# Patient Record
Sex: FEMALE | Race: WHITE | NOT HISPANIC OR LATINO | Employment: OTHER | URBAN - METROPOLITAN AREA
[De-identification: names, ages, dates, MRNs, and addresses within clinical notes are randomized per-mention and may not be internally consistent; named-entity substitution may affect disease eponyms.]

---

## 2020-06-22 LAB
ALBUMIN: 4.3 G/DL (ref 3.4–5)
ALP BLD-CCNC: 100 U/L (ref 33–136)
ALT SERPL-CCNC: 16 U/L (ref 7–45)
AST SERPL-CCNC: 17 U/L (ref 9–39)
BILIRUB SERPL-MCNC: 0.3 MG/DL (ref 0–1.2)
BILIRUBIN DIRECT: 0.1 MG/DL (ref 0–0.3)
ERYTHROCYTE [DISTWIDTH] IN BLOOD BY AUTOMATED COUNT: 13.1 % (ref 11.5–14)
HCT VFR BLD CALC: 39.1 % (ref 36–46)
HEMOGLOBIN: 12.8 G/DL (ref 12–16)
MCHC RBC AUTO-ENTMCNC: 32.7 G/DL (ref 32–36)
MCV RBC AUTO: 100 FL (ref 80–100)
PLATELET # BLD: 225 X10E9/L (ref 150–450)
RBC # BLD: 3.91 X10E12/L (ref 4–5.2)
TOTAL PROTEIN: 7.5 G/DL (ref 6.4–8.2)
WBC: 7 X10E9/L (ref 4.4–11.3)

## 2020-06-25 LAB
NIL (NEGATIVE) SPOT CONTROL: NORMAL
PANEL A SPOT COUNT: 0
PANEL B SPOT COUNT: 0
POSITIVE CONTROL SPOT COUNT: NORMAL
T-SPOT TB INTERPRETATION: NEGATIVE

## 2023-04-11 LAB
ALANINE AMINOTRANSFERASE (SGPT) (U/L) IN SER/PLAS: 13 U/L (ref 7–45)
ALBUMIN (G/DL) IN SER/PLAS: 4.2 G/DL (ref 3.4–5)
ALKALINE PHOSPHATASE (U/L) IN SER/PLAS: 110 U/L (ref 33–136)
ANION GAP IN SER/PLAS: 13 MMOL/L (ref 10–20)
ASPARTATE AMINOTRANSFERASE (SGOT) (U/L) IN SER/PLAS: 16 U/L (ref 9–39)
BILIRUBIN TOTAL (MG/DL) IN SER/PLAS: 0.4 MG/DL (ref 0–1.2)
CALCIUM (MG/DL) IN SER/PLAS: 9.7 MG/DL (ref 8.6–10.3)
CARBON DIOXIDE, TOTAL (MMOL/L) IN SER/PLAS: 28 MMOL/L (ref 21–32)
CHLORIDE (MMOL/L) IN SER/PLAS: 105 MMOL/L (ref 98–107)
CHOLESTEROL (MG/DL) IN SER/PLAS: 195 MG/DL (ref 0–199)
CHOLESTEROL IN HDL (MG/DL) IN SER/PLAS: 56.9 MG/DL
CHOLESTEROL/HDL RATIO: 3.4
CREATININE (MG/DL) IN SER/PLAS: 1.05 MG/DL (ref 0.5–1.05)
ERYTHROCYTE DISTRIBUTION WIDTH (RATIO) BY AUTOMATED COUNT: 13.2 % (ref 11.5–14.5)
ERYTHROCYTE MEAN CORPUSCULAR HEMOGLOBIN CONCENTRATION (G/DL) BY AUTOMATED: 32.7 G/DL (ref 32–36)
ERYTHROCYTE MEAN CORPUSCULAR VOLUME (FL) BY AUTOMATED COUNT: 100 FL (ref 80–100)
ERYTHROCYTES (10*6/UL) IN BLOOD BY AUTOMATED COUNT: 3.83 X10E12/L (ref 4–5.2)
GFR FEMALE: 53 ML/MIN/1.73M2
GLUCOSE (MG/DL) IN SER/PLAS: 92 MG/DL (ref 74–99)
HEMATOCRIT (%) IN BLOOD BY AUTOMATED COUNT: 38.2 % (ref 36–46)
HEMOGLOBIN (G/DL) IN BLOOD: 12.5 G/DL (ref 12–16)
LDL: 104 MG/DL (ref 0–99)
LEUKOCYTES (10*3/UL) IN BLOOD BY AUTOMATED COUNT: 7.4 X10E9/L (ref 4.4–11.3)
PLATELETS (10*3/UL) IN BLOOD AUTOMATED COUNT: 247 X10E9/L (ref 150–450)
POTASSIUM (MMOL/L) IN SER/PLAS: 4.7 MMOL/L (ref 3.5–5.3)
PROTEIN TOTAL: 7.7 G/DL (ref 6.4–8.2)
SODIUM (MMOL/L) IN SER/PLAS: 141 MMOL/L (ref 136–145)
THYROTROPIN (MIU/L) IN SER/PLAS BY DETECTION LIMIT <= 0.05 MIU/L: 1.87 MIU/L (ref 0.44–3.98)
TRIGLYCERIDE (MG/DL) IN SER/PLAS: 173 MG/DL (ref 0–149)
UREA NITROGEN (MG/DL) IN SER/PLAS: 19 MG/DL (ref 6–23)
VLDL: 35 MG/DL (ref 0–40)

## 2023-08-23 PROBLEM — R07.89 CHEST PAIN, NON-CARDIAC: Status: ACTIVE | Noted: 2023-08-23

## 2023-08-23 PROBLEM — E66.3 OVERWEIGHT (BMI 25.0-29.9): Status: ACTIVE | Noted: 2023-08-23

## 2023-08-23 PROBLEM — I73.9 INTERMITTENT CLAUDICATION (CMS-HCC): Status: ACTIVE | Noted: 2023-08-23

## 2023-08-23 PROBLEM — R09.89 BRUIT: Status: ACTIVE | Noted: 2023-08-23

## 2023-08-23 PROBLEM — R06.02 SHORTNESS OF BREATH: Status: ACTIVE | Noted: 2023-08-23

## 2023-08-23 PROBLEM — E78.5 DYSLIPIDEMIA: Status: ACTIVE | Noted: 2023-08-23

## 2023-08-23 PROBLEM — R55 SYNCOPE: Status: ACTIVE | Noted: 2023-08-23

## 2023-08-23 PROBLEM — E78.2 MIXED HYPERLIPIDEMIA: Status: ACTIVE | Noted: 2023-08-23

## 2023-08-23 PROBLEM — D64.9 ANEMIA: Status: ACTIVE | Noted: 2023-08-23

## 2023-08-23 PROBLEM — I10 ESSENTIAL HYPERTENSION: Status: ACTIVE | Noted: 2023-08-23

## 2023-08-23 PROBLEM — Z87.11 HISTORY OF PEPTIC ULCER: Status: ACTIVE | Noted: 2023-08-23

## 2023-08-23 PROBLEM — R68.89 ABNORMAL ANKLE BRACHIAL INDEX: Status: ACTIVE | Noted: 2023-08-23

## 2023-08-23 PROBLEM — Z98.61 CAD S/P PERCUTANEOUS CORONARY ANGIOPLASTY: Status: ACTIVE | Noted: 2023-08-23

## 2023-08-23 PROBLEM — I65.23 BILATERAL CAROTID ARTERY STENOSIS: Status: ACTIVE | Noted: 2023-08-23

## 2023-08-23 PROBLEM — G35 MULTIPLE SCLEROSIS, RELAPSING-REMITTING (MULTI): Status: ACTIVE | Noted: 2023-08-23

## 2023-08-23 PROBLEM — R07.9 CHEST PAIN: Status: ACTIVE | Noted: 2023-08-23

## 2023-08-23 PROBLEM — R22.2 MASS OF CHEST WALL, LEFT: Status: ACTIVE | Noted: 2023-08-23

## 2023-08-23 PROBLEM — Z90.710 H/O HYSTERECTOMY FOR BENIGN DISEASE: Status: ACTIVE | Noted: 2023-08-23

## 2023-08-23 PROBLEM — R00.2 PALPITATIONS: Status: ACTIVE | Noted: 2023-08-23

## 2023-08-23 PROBLEM — R23.2 HOT FLASHES: Status: ACTIVE | Noted: 2023-08-23

## 2023-08-23 PROBLEM — Z78.0 POST-MENOPAUSAL: Status: ACTIVE | Noted: 2023-08-23

## 2023-08-23 PROBLEM — M19.90 ARTHRITIS: Status: ACTIVE | Noted: 2023-08-23

## 2023-08-23 PROBLEM — R92.30 DENSE BREAST TISSUE ON MAMMOGRAM: Status: ACTIVE | Noted: 2023-08-23

## 2023-08-23 PROBLEM — I25.10 CAD S/P PERCUTANEOUS CORONARY ANGIOPLASTY: Status: ACTIVE | Noted: 2023-08-23

## 2023-08-23 PROBLEM — R06.89 DIFFICULTY BREATHING: Status: ACTIVE | Noted: 2023-08-23

## 2023-08-23 RX ORDER — IBUPROFEN 100 MG/5ML
1 SUSPENSION, ORAL (FINAL DOSE FORM) ORAL DAILY
COMMUNITY
Start: 2021-09-23

## 2023-08-23 RX ORDER — PREDNISOLONE ACETATE 10 MG/ML
2 SUSPENSION/ DROPS OPHTHALMIC 2 TIMES DAILY
COMMUNITY
Start: 2020-08-05 | End: 2024-03-27 | Stop reason: WASHOUT

## 2023-08-23 RX ORDER — CETIRIZINE HYDROCHLORIDE 10 MG/1
1 TABLET ORAL DAILY
COMMUNITY

## 2023-08-23 RX ORDER — NITROGLYCERIN 0.4 MG/1
1 TABLET SUBLINGUAL EVERY 5 MIN PRN
COMMUNITY
Start: 2022-02-14

## 2023-08-23 RX ORDER — DICYCLOMINE HYDROCHLORIDE 10 MG/1
1 CAPSULE ORAL 2 TIMES DAILY
COMMUNITY
Start: 2022-05-05 | End: 2024-03-27 | Stop reason: WASHOUT

## 2023-08-23 RX ORDER — PREDNISOLONE ACETATE 10 MG/ML
1 SUSPENSION/ DROPS OPHTHALMIC
COMMUNITY
Start: 2023-07-26

## 2023-08-23 RX ORDER — BUTALBITAL, ACETAMINOPHEN AND CAFFEINE 50; 325; 40 MG/1; MG/1; MG/1
1 TABLET ORAL EVERY 4 HOURS PRN
COMMUNITY
End: 2024-03-27 | Stop reason: WASHOUT

## 2023-08-23 RX ORDER — ALBUTEROL SULFATE 90 UG/1
2 AEROSOL, METERED RESPIRATORY (INHALATION) EVERY 6 HOURS PRN
COMMUNITY
End: 2024-03-27 | Stop reason: WASHOUT

## 2023-08-23 RX ORDER — ACETAMINOPHEN 500 MG
1 TABLET ORAL DAILY
COMMUNITY

## 2023-08-23 RX ORDER — PANTOPRAZOLE SODIUM 40 MG/1
1 TABLET, DELAYED RELEASE ORAL DAILY
COMMUNITY
Start: 2021-09-23

## 2023-08-23 RX ORDER — MELOXICAM 7.5 MG/1
1 TABLET ORAL DAILY PRN
COMMUNITY

## 2023-08-23 RX ORDER — FLAXSEED OIL 1000 MG
CAPSULE ORAL
COMMUNITY
Start: 2020-08-05

## 2023-08-23 RX ORDER — FLUTICASONE FUROATE 100 UG/1
1 POWDER RESPIRATORY (INHALATION) DAILY
COMMUNITY
Start: 2020-08-05

## 2023-08-23 RX ORDER — MAGNESIUM 200 MG
1 TABLET ORAL DAILY
COMMUNITY

## 2023-08-23 RX ORDER — ASPIRIN 325 MG
TABLET, DELAYED RELEASE (ENTERIC COATED) ORAL
COMMUNITY
Start: 2021-09-23 | End: 2024-03-27 | Stop reason: WASHOUT

## 2023-08-23 RX ORDER — ASPIRIN 81 MG/1
1 TABLET ORAL DAILY
COMMUNITY
End: 2024-03-27 | Stop reason: WASHOUT

## 2023-08-23 RX ORDER — METOPROLOL SUCCINATE 100 MG/1
1 TABLET, EXTENDED RELEASE ORAL DAILY
COMMUNITY
Start: 2021-09-23 | End: 2024-03-25

## 2023-08-23 RX ORDER — NAPROXEN SODIUM 220 MG/1
TABLET, FILM COATED ORAL
COMMUNITY
Start: 2021-09-23

## 2023-08-23 RX ORDER — BUTALBITAL, ACETAMINOPHEN, CAFFEINE AND CODEINE PHOSPHATE 50; 325; 40; 30 MG/1; MG/1; MG/1; MG/1
CAPSULE ORAL
COMMUNITY
Start: 2022-03-23

## 2023-08-23 RX ORDER — LOSARTAN POTASSIUM 50 MG/1
1 TABLET ORAL DAILY
COMMUNITY
Start: 2021-09-23

## 2023-10-01 PROBLEM — Z78.0 POST-MENOPAUSAL: Status: RESOLVED | Noted: 2023-08-23 | Resolved: 2023-10-01

## 2023-10-03 ENCOUNTER — OFFICE VISIT (OUTPATIENT)
Dept: OBSTETRICS AND GYNECOLOGY | Facility: CLINIC | Age: 83
End: 2023-10-03
Payer: MEDICARE

## 2023-10-03 VITALS
SYSTOLIC BLOOD PRESSURE: 110 MMHG | HEIGHT: 63 IN | BODY MASS INDEX: 27.46 KG/M2 | DIASTOLIC BLOOD PRESSURE: 70 MMHG | WEIGHT: 155 LBS

## 2023-10-03 DIAGNOSIS — Z90.710 H/O HYSTERECTOMY FOR BENIGN DISEASE: ICD-10-CM

## 2023-10-03 DIAGNOSIS — Z12.31 ENCOUNTER FOR SCREENING MAMMOGRAM FOR BREAST CANCER: ICD-10-CM

## 2023-10-03 DIAGNOSIS — N95.1 VASOMOTOR SYMPTOMS DUE TO MENOPAUSE: ICD-10-CM

## 2023-10-03 DIAGNOSIS — Z01.419 NORMAL GYNECOLOGIC EXAMINATION: Primary | ICD-10-CM

## 2023-10-03 PROCEDURE — 1159F MED LIST DOCD IN RCRD: CPT | Performed by: OBSTETRICS & GYNECOLOGY

## 2023-10-03 PROCEDURE — 99397 PER PM REEVAL EST PAT 65+ YR: CPT | Performed by: OBSTETRICS & GYNECOLOGY

## 2023-10-03 PROCEDURE — 3078F DIAST BP <80 MM HG: CPT | Performed by: OBSTETRICS & GYNECOLOGY

## 2023-10-03 PROCEDURE — 1036F TOBACCO NON-USER: CPT | Performed by: OBSTETRICS & GYNECOLOGY

## 2023-10-03 PROCEDURE — 3074F SYST BP LT 130 MM HG: CPT | Performed by: OBSTETRICS & GYNECOLOGY

## 2023-10-03 NOTE — PROGRESS NOTES
GYNECOLOGIC PROGRESS NOTE    CC:    Chief Complaint   Patient presents with    Gynecologic Exam     Annual exam - would also like to discuss hot flashes - was on estrogen but no longer on it  Mamm 10/2021 het dense  Dexa 4/2023 normal  Colon 2013        HPI:  Lashell Alvarez is here for a routine GYN examination.  Having recurrent menopausal symptoms off of HRT that she has been on for 40+ years since her hysterectomy with removal of her ovaries approximately age 41.  Patient is having clammy spells in the later afternoon and evening and that is impact impeding her sleep, she is not having true hot flashes but just feels that she has temperature on balance related to being off of her HRT.    Depression screen:  negative  Fall screen:  negative  DV screen:  negative      ROS:  GEN - no fevers or chills  RESP - no SOB or cough  GI - no blood in BMs  URO - no hematuria  GYN - no VB or vaginal discharge  ENDO - see HPI  PSYCH - mood OK      Past Medical History:   Diagnosis Date    Personal history of other diseases of the musculoskeletal system and connective tissue 08/05/2020    History of osteopenia    Personal history of other specified conditions     History of anesthesia complications     Past Surgical History:   Procedure Laterality Date    OTHER SURGICAL HISTORY  09/17/2020    Hand surgery    OTHER SURGICAL HISTORY  03/09/2022    Colonoscopy    OTHER SURGICAL HISTORY  03/09/2022    Cholecystectomy    OTHER SURGICAL HISTORY  03/09/2022    Cardiac catheterization    OTHER SURGICAL HISTORY  03/09/2022    Cornea transplantation    OTHER SURGICAL HISTORY  03/09/2022    Nose surgery    OTHER SURGICAL HISTORY  03/09/2022    Bile duct reconstruction    OTHER SURGICAL HISTORY  03/09/2022    Esophagogastroduodenoscopy    OTHER SURGICAL HISTORY  09/24/2021    Hysterectomy abdominal     Social History     Socioeconomic History    Marital status:      Spouse name: Not on file    Number of children: Not on file    Years of  "education: Not on file    Highest education level: Not on file   Occupational History    Not on file   Tobacco Use    Smoking status: Never    Smokeless tobacco: Never   Substance and Sexual Activity    Alcohol use: Not on file    Drug use: Not on file    Sexual activity: Not on file   Other Topics Concern    Not on file   Social History Narrative    Not on file     Social Determinants of Health     Financial Resource Strain: Not on file   Food Insecurity: Not on file   Transportation Needs: Not on file   Physical Activity: Not on file   Stress: Not on file   Social Connections: Not on file   Intimate Partner Violence: Not on file   Housing Stability: Not on file     Cancer-related family history is not on file.       PHYSICAL EXAM:  /70 (BP Location: Left arm, Patient Position: Sitting)   Ht 1.6 m (5' 3\")   Wt 70.3 kg (155 lb)   BMI 27.46 kg/m²   GEN:  A&O, NAD  ABD:  NT/ND, soft, no palpable masses  URO:  atrophic urethral meatus, no bladder TTP  GYN:    -atrophic vulva w/o lesions or ulcers  -cervix/uterus/adnexa surgically absent   -perineum w/o lesions or ulcers  -rectal  w/o external anal hemorrhoids or lesions  BREAST:  no masses or TTP, no skin lesions or nipple discharge  PSYCH:  normal affect, non-anxious        IMPRESSION/PLAN:  Problem List Items Addressed This Visit       H/O hysterectomy for benign disease     Other Visit Diagnoses       Normal gynecologic examination    -  Primary    Encounter for screening mammogram for breast cancer        Relevant Orders    BI mammo bilateral screening tomosynthesis    Vasomotor symptoms due to menopause                 Pap/HPV no longer indicated as > age 65 w/o previous history of HGSIL, also prior hysterectomy for benign indications..  No indication for pap > age 65 in this patient as no Hx of HGSIL and previously up to date with cervical CA screening prior to age 65 and not immunosuppressed.     Mammograms:  Counseling done on potentially stopping " mammograms > age 75 per USPTF guidelines--=patient is healthy with expected life expectancy of at least 10 years, she elects to continue screening mammograms at this time every other year.    Colon CA screening: normal in 2013, GI Dr. Julian does not recommend any more screenings (given her age and her tortuous colon on last colonoscopy).    DEXA scan normal in 2023.       No need for follow-up with GYN given prior CONCHA/BSO for benign indications and no GYN Rx use.        Jamie Suresh, DO

## 2023-11-01 ENCOUNTER — TELEPHONE (OUTPATIENT)
Dept: PRIMARY CARE | Facility: CLINIC | Age: 83
End: 2023-11-01
Payer: MEDICARE

## 2023-11-01 NOTE — TELEPHONE ENCOUNTER
Patient calling to ask if you wanted to re-check her B-12 level lab? Stated that she had labs done in April and B-12 was slightly low, so she was not clear if you wanted to repeat lab and when you wanted to see her next? Stated that she can schedule yearly and labs for next year if nothing has to be done.   Please advise.

## 2023-11-05 DIAGNOSIS — E78.5 DYSLIPIDEMIA: ICD-10-CM

## 2023-11-05 DIAGNOSIS — Z78.0 POST-MENOPAUSE: ICD-10-CM

## 2023-11-05 DIAGNOSIS — I10 ESSENTIAL HYPERTENSION: ICD-10-CM

## 2023-11-05 DIAGNOSIS — E55.9 VITAMIN D INSUFFICIENCY: ICD-10-CM

## 2023-11-05 DIAGNOSIS — D51.8 OTHER VITAMIN B12 DEFICIENCY ANEMIA: Primary | ICD-10-CM

## 2023-11-06 NOTE — TELEPHONE ENCOUNTER
Please let Lashell know that her labs from April were not bad and I don't feel I need to repeat anything at this time.  I will put an order on her chart for March 2024 and she can schedule a follow up after she has her next blood work done.  If she gets sick this winter, she can call for a virtual visit sooner.  She should come in again around March or April or sooner if needed.

## 2023-11-11 ENCOUNTER — APPOINTMENT (OUTPATIENT)
Dept: RADIOLOGY | Facility: HOSPITAL | Age: 83
End: 2023-11-11
Payer: MEDICARE

## 2024-03-22 DIAGNOSIS — I10 ESSENTIAL (PRIMARY) HYPERTENSION: ICD-10-CM

## 2024-03-25 RX ORDER — METOPROLOL SUCCINATE 100 MG/1
100 TABLET, EXTENDED RELEASE ORAL DAILY
Qty: 90 TABLET | Refills: 3 | Status: SHIPPED | OUTPATIENT
Start: 2024-03-25

## 2024-03-27 ENCOUNTER — OFFICE VISIT (OUTPATIENT)
Dept: CARDIOLOGY | Facility: CLINIC | Age: 84
End: 2024-03-27
Payer: MEDICARE

## 2024-03-27 VITALS
HEIGHT: 62 IN | BODY MASS INDEX: 29.04 KG/M2 | SYSTOLIC BLOOD PRESSURE: 124 MMHG | HEART RATE: 76 BPM | DIASTOLIC BLOOD PRESSURE: 76 MMHG | WEIGHT: 157.8 LBS

## 2024-03-27 DIAGNOSIS — E78.2 MIXED HYPERLIPIDEMIA: ICD-10-CM

## 2024-03-27 DIAGNOSIS — R07.9 CHEST PAIN, UNSPECIFIED TYPE: ICD-10-CM

## 2024-03-27 DIAGNOSIS — G35 MULTIPLE SCLEROSIS, RELAPSING-REMITTING (MULTI): ICD-10-CM

## 2024-03-27 DIAGNOSIS — I10 ESSENTIAL HYPERTENSION: ICD-10-CM

## 2024-03-27 DIAGNOSIS — I65.23 BILATERAL CAROTID ARTERY STENOSIS: ICD-10-CM

## 2024-03-27 DIAGNOSIS — I25.10 CAD S/P PERCUTANEOUS CORONARY ANGIOPLASTY: ICD-10-CM

## 2024-03-27 DIAGNOSIS — R06.02 SHORTNESS OF BREATH: ICD-10-CM

## 2024-03-27 DIAGNOSIS — Z78.9 NEVER SMOKED CIGARETTES: ICD-10-CM

## 2024-03-27 DIAGNOSIS — Z98.61 CAD S/P PERCUTANEOUS CORONARY ANGIOPLASTY: ICD-10-CM

## 2024-03-27 PROCEDURE — 99214 OFFICE O/P EST MOD 30 MIN: CPT | Performed by: INTERNAL MEDICINE

## 2024-03-27 PROCEDURE — 1036F TOBACCO NON-USER: CPT | Performed by: INTERNAL MEDICINE

## 2024-03-27 PROCEDURE — 3078F DIAST BP <80 MM HG: CPT | Performed by: INTERNAL MEDICINE

## 2024-03-27 PROCEDURE — 3074F SYST BP LT 130 MM HG: CPT | Performed by: INTERNAL MEDICINE

## 2024-03-27 PROCEDURE — 1159F MED LIST DOCD IN RCRD: CPT | Performed by: INTERNAL MEDICINE

## 2024-03-27 ASSESSMENT — ENCOUNTER SYMPTOMS: SHORTNESS OF BREATH: 1

## 2024-03-27 NOTE — PROGRESS NOTES
CARDIOLOGY OFFICE VISIT      CHIEF COMPLAINT      HISTORY OF PRESENT ILLNESS  The patient states that she has noticed more shortness of breath and fatigue with activities than previous.  She is uncertain whether this is due to her multiple sclerosis or whether something could be going on from a cardiac standpoint.  She states that earlier this year she had a bad episode of RSV and she wonders whether she is still recuperating a bit from that.  She denies chest discomfort.  She denies prolonged palpitations or syncope.  She denies any problems or medications.  I told her we will do a Lexiscan to make sure there is no evidence of myocardial ischemia in view of the dyspnea with activities.  She did have cardiac catheterization done about 2 years ago and it was satisfactory.      IMPRESSION:   1. Coronary artery disease, no angina.  2. Remote multivessel PCI, last 2013.  3. Essential hypertension.  4. Mixed hyperlipidemia. Unable to tolerate statins due to severe myalgias.  5. Carotid artery disease, bilateral, mild, asymptomatic.  6. History of gastroesophageal reflux.  7. Overweight.  8. Multiple sclerosis        Please excuse any errors in grammar or translation related to this dictation. Voice recognition software was utilized to prepare this document.            Past Medical History  Past Medical History:   Diagnosis Date    Personal history of other diseases of the musculoskeletal system and connective tissue 08/05/2020    History of osteopenia    Personal history of other specified conditions     History of anesthesia complications       Social History  Social History     Tobacco Use    Smoking status: Never    Smokeless tobacco: Never   Substance Use Topics    Alcohol use: Never    Drug use: Never       Family History     Family History   Problem Relation Name Age of Onset    Heart attack Mother      Heart attack Father      Multiple sclerosis Mother's Brother      Multiple sclerosis Other           Allergies:  Allergies   Allergen Reactions    Metoprolol Other and Shortness of breath    Adhesive Tape-Silicones Unknown    Albuterol Unknown    Atorvastatin Unknown    Clopidogrel Unknown    Codeine Unknown    Ezetimibe Unknown    Fenofibrate Unknown    Fluvastatin Unknown    Mupirocin Unknown    Penicillin Unknown    Pravastatin Unknown    Shellfish Containing Products Unknown    Statins-Hmg-Coa Reductase Inhibitors Unknown    Sulfa (Sulfonamide Antibiotics) Unknown    Tobradex [Tobramycin-Dexamethasone] Unknown    Trilipix [Fenofibric Acid (Choline)] Unknown    Wheat Unknown        Outpatient Medications:  Current Outpatient Medications   Medication Instructions    ascorbic acid (Vitamin C) 1,000 mg tablet 1 tablet, oral, Daily    aspirin 81 mg chewable tablet oral, Daily RT, CHEW AND SWALLOW 1 TABLET DAILY.    butalbitaL-acetaminop-caf-cod (Fioricet W/Codeine) -18-30 mg capsule oral, As needed    cetirizine (ZyrTEC) 10 mg tablet 1 tablet, oral, Daily, In the evening    cholecalciferol (Vitamin D-3) 50 mcg (2,000 unit) capsule 1 capsule, oral, Daily    cyanocobalamin, vitamin B-12, 1,000 mcg tablet, sublingual 1 tablet, sublingual, Daily    flaxseed oiL 1,000 mg capsule oral, Take as directed    fluticasone furoate (Arnuity Ellipta) 100 mcg/actuation inhaler 1 puff, inhalation, Daily    losartan (Cozaar) 50 mg tablet 1 tablet, oral, Daily    meloxicam (Mobic) 7.5 mg tablet 1 tablet, oral, Daily PRN    nitroglycerin (Nitrostat) 0.4 mg SL tablet 1 tablet, sublingual, Every 5 min PRN, every 5 minutes x 3 doses , As needed for chest pain    pantoprazole (Protonix) 40 mg EC tablet 1 tablet, oral, Daily    prednisoLONE acetate (Pred Forte) 1 % ophthalmic suspension 1 drop, Right Eye, Daily RT    Toprol  mg, oral, Daily          REVIEW OF SYSTEMS  Review of Systems   Cardiovascular:  Positive for chest pain.   Respiratory:  Positive for shortness of breath.          VITALS  Vitals:    03/27/24 1526   BP:  124/76   Pulse: 76       PHYSICAL EXAM  Vitals and nursing note reviewed.   Constitutional:       Appearance: Healthy appearance.   Eyes:      Conjunctiva/sclera: Conjunctivae normal.      Pupils: Pupils are equal, round, and reactive to light.   Pulmonary:      Effort: Pulmonary effort is normal.      Breath sounds: Normal breath sounds.   Cardiovascular:      PMI at left midclavicular line. Normal rate. Regular rhythm.      Murmurs: There is no murmur.      No gallop.  No click. No rub.   Pulses:     Intact distal pulses.   Edema:     Peripheral edema absent.   Musculoskeletal: Normal range of motion. Skin:     General: Skin is warm and dry.   Neurological:      Mental Status: Alert and oriented to person, place and time.           ASSESSMENT AND PLAN  Diagnoses and all orders for this visit:  Chest pain, unspecified type  CAD S/P percutaneous coronary angioplasty  Essential hypertension  Mixed hyperlipidemia  Bilateral carotid artery stenosis  Multiple sclerosis, relapsing-remitting (CMS/HCC)  BMI 28.0-28.9,adult  Never smoked cigarettes  Shortness of breath      [unfilled]

## 2024-03-27 NOTE — PATIENT INSTRUCTIONS
You will be scheduled for a Lexiscan nuclear stress test  Will call patient with test results once reviewed by physician    Follow up office visit in 1 year.    Continue same medications/treatment.  Patient educated on proper medication use.  Patient educated on risk factor modification.  Please bring any lab results from other providers / physicians to your next appointment.    Please bring all medicines, vitamins and herbal supplements with you when you come to the office.    Prescriptions will not be filled unless you are compliant with your follow up appointments or have a follow up  appointment scheduled as per instruction of your physician.  Refills should be requested at the time of  Your visit.    Silvina FAIR LPN, am scribing for and in the presence of Dr. Harley Yeboah MD, FACC

## 2024-04-16 ENCOUNTER — APPOINTMENT (OUTPATIENT)
Dept: ORTHOPEDIC SURGERY | Facility: CLINIC | Age: 84
End: 2024-04-16
Payer: MEDICARE

## 2024-04-17 ENCOUNTER — APPOINTMENT (OUTPATIENT)
Dept: RADIOLOGY | Facility: CLINIC | Age: 84
End: 2024-04-17
Payer: MEDICARE

## 2024-04-17 ENCOUNTER — APPOINTMENT (OUTPATIENT)
Dept: CARDIOLOGY | Facility: CLINIC | Age: 84
End: 2024-04-17
Payer: MEDICARE

## 2024-04-25 ENCOUNTER — APPOINTMENT (OUTPATIENT)
Dept: CARDIOLOGY | Facility: CLINIC | Age: 84
End: 2024-04-25
Payer: MEDICARE

## 2024-04-30 ENCOUNTER — APPOINTMENT (OUTPATIENT)
Dept: ORTHOPEDIC SURGERY | Facility: CLINIC | Age: 84
End: 2024-04-30
Payer: MEDICARE

## 2024-05-14 ENCOUNTER — APPOINTMENT (OUTPATIENT)
Dept: ORTHOPEDIC SURGERY | Facility: CLINIC | Age: 84
End: 2024-05-14
Payer: MEDICARE

## 2024-08-07 ENCOUNTER — HOSPITAL ENCOUNTER (OUTPATIENT)
Dept: RADIOLOGY | Facility: CLINIC | Age: 84
Discharge: HOME | End: 2024-08-07
Payer: MEDICARE

## 2024-08-07 ENCOUNTER — TELEPHONE (OUTPATIENT)
Dept: CARDIOLOGY | Facility: CLINIC | Age: 84
End: 2024-08-07
Payer: MEDICARE

## 2024-08-07 ENCOUNTER — HOSPITAL ENCOUNTER (OUTPATIENT)
Dept: CARDIOLOGY | Facility: CLINIC | Age: 84
Discharge: HOME | End: 2024-08-07
Payer: MEDICARE

## 2024-08-07 DIAGNOSIS — R06.02 SHORTNESS OF BREATH: ICD-10-CM

## 2024-08-07 DIAGNOSIS — I25.10 CAD S/P PERCUTANEOUS CORONARY ANGIOPLASTY: ICD-10-CM

## 2024-08-07 DIAGNOSIS — Z98.61 CAD S/P PERCUTANEOUS CORONARY ANGIOPLASTY: ICD-10-CM

## 2024-08-07 DIAGNOSIS — R07.9 CHEST PAIN, UNSPECIFIED TYPE: ICD-10-CM

## 2024-08-07 DIAGNOSIS — R07.9 CHEST PAIN: ICD-10-CM

## 2024-08-07 DIAGNOSIS — R06.02 SHORTNESS OF BREATH: Primary | ICD-10-CM

## 2024-08-07 PROCEDURE — 3430000001 HC RX 343 DIAGNOSTIC RADIOPHARMACEUTICALS: Performed by: INTERNAL MEDICINE

## 2024-08-07 PROCEDURE — 2500000004 HC RX 250 GENERAL PHARMACY W/ HCPCS (ALT 636 FOR OP/ED): Performed by: INTERNAL MEDICINE

## 2024-08-07 PROCEDURE — 78452 HT MUSCLE IMAGE SPECT MULT: CPT

## 2024-08-07 PROCEDURE — A9502 TC99M TETROFOSMIN: HCPCS | Performed by: INTERNAL MEDICINE

## 2024-08-07 PROCEDURE — 93017 CV STRESS TEST TRACING ONLY: CPT

## 2024-08-07 RX ORDER — REGADENOSON 0.08 MG/ML
0.4 INJECTION, SOLUTION INTRAVENOUS ONCE
Status: COMPLETED | OUTPATIENT
Start: 2024-08-07 | End: 2024-08-07

## 2024-08-07 NOTE — TELEPHONE ENCOUNTER
----- Message from Nurse Silvina LANGSTON sent at 8/7/2024 11:41 AM EDT -----    ----- Message -----  From: Harley Yeboah MD  Sent: 8/7/2024  11:35 AM EDT  To: Silvina Schmidt LPN    Normal nuclear stress test  ----- Message -----  From: Interface, Radiology Results In  Sent: 8/7/2024  10:39 AM EDT  To: Harley Yeboah MD

## 2024-08-09 ENCOUNTER — HOSPITAL ENCOUNTER (OUTPATIENT)
Dept: RADIOLOGY | Facility: CLINIC | Age: 84
Discharge: HOME | End: 2024-08-09
Payer: MEDICARE

## 2024-08-09 ENCOUNTER — OFFICE VISIT (OUTPATIENT)
Dept: ORTHOPEDIC SURGERY | Facility: CLINIC | Age: 84
End: 2024-08-09
Payer: MEDICARE

## 2024-08-09 VITALS — BODY MASS INDEX: 28.89 KG/M2 | HEIGHT: 62 IN | WEIGHT: 157 LBS

## 2024-08-09 DIAGNOSIS — M54.50 LUMBAR PAIN: ICD-10-CM

## 2024-08-09 PROCEDURE — 72110 X-RAY EXAM L-2 SPINE 4/>VWS: CPT

## 2024-08-09 PROCEDURE — 99214 OFFICE O/P EST MOD 30 MIN: CPT | Performed by: ORTHOPAEDIC SURGERY

## 2024-08-09 ASSESSMENT — ENCOUNTER SYMPTOMS
LOSS OF SENSATION IN FEET: 0
DEPRESSION: 0
OCCASIONAL FEELINGS OF UNSTEADINESS: 0

## 2024-08-09 ASSESSMENT — PAIN SCALES - GENERAL: PAINLEVEL_OUTOF10: 3

## 2024-08-09 ASSESSMENT — PAIN - FUNCTIONAL ASSESSMENT: PAIN_FUNCTIONAL_ASSESSMENT: 0-10

## 2024-08-09 NOTE — PROGRESS NOTES
"Lashell Alvarez is a 84 y.o. female who presents for Pain of the Lower Back (xrays).    HPI:  84-year-old female here for new patient evaluation of low back pain.  She denies any fever chills nausea vomiting night sweats.  She has no bowel or bladder complaints.    Physical exam:  Well-nourished, well kept.No lymphangitis or lymphadenopathy in the examined extremities.  Gait normal.  Can stand on heels and toes.   Examination of the back shows mild tenderness in the paraspinous musculature.  There is no significant decreased range of motion in all directions due to guarding/muscle spasms and pain at extremes.  There is good strength and no instability.  Examination of the lower extremities reveals no point tenderness, swelling, or deformity.  Range of motion of the hips, knees, and ankles are full without crepitance, instability, or exacerbation of pain.  Strength is 5/5 throughout except right knee flexion extension and hip flexion is about 4+/5.  No redness, abrasions, or lesions on extremities  Gross sensation intact in the extremities.  Deep tendon reflexes 1+ bilateral. Clonus negative.  Affect normal.  Alert and oriented ×3.  Coordination normal.    Imaging studies:  AP PA lateral flexion-extension plain films of the lumbar spine were ordered and reviewed today.  I also reviewed a DEXA scan from 2023.    Assessment:  84-year-old female here for new patient evaluation of mostly low back pain.  She has a spondylolisthesis at L3 on 4, and severe degenerative disc disease at that level.  She had a back injury many many years ago, and she is having minimal low back pain now.  She says that it feels like her back is \"slipping\"and she wanted to be evaluated to see if there was anything serious that needed to be addressed.  She is not getting any radiating back pain however she does have multiple sclerosis so she has issues with her right leg and hip.  No recent physical therapy or chiropractic care, she had some " cortisone injections many years ago, no history of back surgery.    We have ordered and reviewed test today x-rays, DEXA scan.  I reviewed the notes from Dr. Yeboah that discusses her multiple sclerosis from March 29, 2023.  This is an exacerbation of a chronic problem.  Her sister is with her today acting as helpful and necessary historian.    For complete plan and/or surgical details, please refer to Dr. Espana's portion of this split dictation.    -Burton Matthews PA-C    In a face-to-face encounter, I performed a history and physical examination, discussed pertinent diagnostic studies if indicated, and discussed diagnosis and management strategies with both the patient and the midlevel provider.  I reviewed the midlevel's note and agree with the documented findings and plan of care.    Back pain.  Patient wanted to make sure she was not getting up paralyzed.  X-rays show an old spondylolisthesis at L3-4 which is only a few millimeters.  She has degenerative changes at that level.  Overall her alignment of her spine is normal.  I explained to her that she is as likely as the average person her age to have a fall and have paralysis or any other severe complication.  She has no restrictions with her activity.  If her back pain is tolerable she can engage in activities as tolerated.  This is a patient who has had an exacerbation of a chronic problem.  We have ordered and reviewed x-rays today.  We also reviewed the results of a DEXA scan today.    Khang Espana MD  Orthopedic surgery

## 2024-08-13 DIAGNOSIS — I10 ESSENTIAL (PRIMARY) HYPERTENSION: ICD-10-CM

## 2024-08-13 RX ORDER — LOSARTAN POTASSIUM 50 MG/1
50 TABLET, FILM COATED ORAL DAILY
Qty: 90 TABLET | Refills: 3 | Status: SHIPPED | OUTPATIENT
Start: 2024-08-13

## 2024-09-09 ENCOUNTER — APPOINTMENT (OUTPATIENT)
Dept: PRIMARY CARE | Facility: CLINIC | Age: 84
End: 2024-09-09
Payer: MEDICARE

## 2024-10-17 ENCOUNTER — LAB (OUTPATIENT)
Dept: LAB | Facility: LAB | Age: 84
End: 2024-10-17

## 2024-10-17 DIAGNOSIS — E55.9 VITAMIN D INSUFFICIENCY: ICD-10-CM

## 2024-10-17 DIAGNOSIS — E78.5 DYSLIPIDEMIA: ICD-10-CM

## 2024-10-17 DIAGNOSIS — D51.8 OTHER VITAMIN B12 DEFICIENCY ANEMIA: ICD-10-CM

## 2024-10-17 DIAGNOSIS — Z78.0 POST-MENOPAUSE: ICD-10-CM

## 2024-10-17 DIAGNOSIS — I10 ESSENTIAL HYPERTENSION: ICD-10-CM

## 2024-10-17 LAB
25(OH)D3 SERPL-MCNC: 72 NG/ML (ref 30–100)
ALBUMIN SERPL BCP-MCNC: 4.5 G/DL (ref 3.4–5)
ALP SERPL-CCNC: 119 U/L (ref 33–136)
ALT SERPL W P-5'-P-CCNC: 13 U/L (ref 7–45)
ANION GAP SERPL CALC-SCNC: 13 MMOL/L (ref 10–20)
AST SERPL W P-5'-P-CCNC: 15 U/L (ref 9–39)
BASOPHILS # BLD AUTO: 0.05 X10*3/UL (ref 0–0.1)
BASOPHILS NFR BLD AUTO: 0.6 %
BILIRUB SERPL-MCNC: 0.6 MG/DL (ref 0–1.2)
BUN SERPL-MCNC: 19 MG/DL (ref 6–23)
CALCIUM SERPL-MCNC: 10.4 MG/DL (ref 8.6–10.3)
CHLORIDE SERPL-SCNC: 103 MMOL/L (ref 98–107)
CHOLEST SERPL-MCNC: 238 MG/DL (ref 0–199)
CHOLESTEROL/HDL RATIO: 4.2
CO2 SERPL-SCNC: 28 MMOL/L (ref 21–32)
CREAT SERPL-MCNC: 1.04 MG/DL (ref 0.5–1.05)
EGFRCR SERPLBLD CKD-EPI 2021: 53 ML/MIN/1.73M*2
EOSINOPHIL # BLD AUTO: 0.14 X10*3/UL (ref 0–0.4)
EOSINOPHIL NFR BLD AUTO: 1.8 %
ERYTHROCYTE [DISTWIDTH] IN BLOOD BY AUTOMATED COUNT: 13 % (ref 11.5–14.5)
GLUCOSE SERPL-MCNC: 107 MG/DL (ref 74–99)
HCT VFR BLD AUTO: 41.5 % (ref 36–46)
HDLC SERPL-MCNC: 57.1 MG/DL
HGB BLD-MCNC: 13.6 G/DL (ref 12–16)
IMM GRANULOCYTES # BLD AUTO: 0.02 X10*3/UL (ref 0–0.5)
IMM GRANULOCYTES NFR BLD AUTO: 0.3 % (ref 0–0.9)
LDLC SERPL CALC-MCNC: 138 MG/DL
LYMPHOCYTES # BLD AUTO: 2.76 X10*3/UL (ref 0.8–3)
LYMPHOCYTES NFR BLD AUTO: 35.2 %
MCH RBC QN AUTO: 32.6 PG (ref 26–34)
MCHC RBC AUTO-ENTMCNC: 32.8 G/DL (ref 32–36)
MCV RBC AUTO: 100 FL (ref 80–100)
MONOCYTES # BLD AUTO: 0.78 X10*3/UL (ref 0.05–0.8)
MONOCYTES NFR BLD AUTO: 10 %
NEUTROPHILS # BLD AUTO: 4.08 X10*3/UL (ref 1.6–5.5)
NEUTROPHILS NFR BLD AUTO: 52.1 %
NON HDL CHOLESTEROL: 181 MG/DL (ref 0–149)
NRBC BLD-RTO: 0 /100 WBCS (ref 0–0)
PLATELET # BLD AUTO: 271 X10*3/UL (ref 150–450)
POTASSIUM SERPL-SCNC: 4.6 MMOL/L (ref 3.5–5.3)
PROT SERPL-MCNC: 7.4 G/DL (ref 6.4–8.2)
RBC # BLD AUTO: 4.17 X10*6/UL (ref 4–5.2)
SODIUM SERPL-SCNC: 139 MMOL/L (ref 136–145)
TRIGL SERPL-MCNC: 213 MG/DL (ref 0–149)
TSH SERPL-ACNC: 2.62 MIU/L (ref 0.44–3.98)
VIT B12 SERPL-MCNC: 929 PG/ML (ref 211–911)
VLDL: 43 MG/DL (ref 0–40)
WBC # BLD AUTO: 7.8 X10*3/UL (ref 4.4–11.3)

## 2024-10-17 PROCEDURE — 36415 COLL VENOUS BLD VENIPUNCTURE: CPT

## 2024-10-17 PROCEDURE — 85025 COMPLETE CBC W/AUTO DIFF WBC: CPT

## 2024-10-17 PROCEDURE — 82306 VITAMIN D 25 HYDROXY: CPT

## 2024-10-17 PROCEDURE — 84443 ASSAY THYROID STIM HORMONE: CPT

## 2024-10-17 PROCEDURE — 80061 LIPID PANEL: CPT

## 2024-10-17 PROCEDURE — 80053 COMPREHEN METABOLIC PANEL: CPT

## 2024-10-17 PROCEDURE — 82607 VITAMIN B-12: CPT

## 2024-10-29 ENCOUNTER — APPOINTMENT (OUTPATIENT)
Dept: PRIMARY CARE | Facility: CLINIC | Age: 84
End: 2024-10-29
Payer: MEDICARE

## 2024-10-29 VITALS
BODY MASS INDEX: 29.22 KG/M2 | TEMPERATURE: 97.9 F | HEIGHT: 62 IN | DIASTOLIC BLOOD PRESSURE: 82 MMHG | WEIGHT: 158.8 LBS | HEART RATE: 76 BPM | SYSTOLIC BLOOD PRESSURE: 140 MMHG | OXYGEN SATURATION: 98 %

## 2024-10-29 DIAGNOSIS — G35 MULTIPLE SCLEROSIS, RELAPSING-REMITTING (MULTI): ICD-10-CM

## 2024-10-29 DIAGNOSIS — Z00.00 MEDICARE ANNUAL WELLNESS VISIT, SUBSEQUENT: Primary | ICD-10-CM

## 2024-10-29 DIAGNOSIS — Z71.2 ENCOUNTER TO DISCUSS TEST RESULTS: ICD-10-CM

## 2024-10-29 DIAGNOSIS — F43.21 GRIEF REACTION: ICD-10-CM

## 2024-10-29 PROBLEM — F43.20 GRIEF REACTION: Status: ACTIVE | Noted: 2024-10-29

## 2024-10-29 PROCEDURE — 3288F FALL RISK ASSESSMENT DOCD: CPT | Performed by: FAMILY MEDICINE

## 2024-10-29 PROCEDURE — 1036F TOBACCO NON-USER: CPT | Performed by: FAMILY MEDICINE

## 2024-10-29 PROCEDURE — 99397 PER PM REEVAL EST PAT 65+ YR: CPT | Performed by: FAMILY MEDICINE

## 2024-10-29 PROCEDURE — 1123F ACP DISCUSS/DSCN MKR DOCD: CPT | Performed by: FAMILY MEDICINE

## 2024-10-29 PROCEDURE — 1159F MED LIST DOCD IN RCRD: CPT | Performed by: FAMILY MEDICINE

## 2024-10-29 PROCEDURE — 1158F ADVNC CARE PLAN TLK DOCD: CPT | Performed by: FAMILY MEDICINE

## 2024-10-29 PROCEDURE — G0444 DEPRESSION SCREEN ANNUAL: HCPCS | Performed by: FAMILY MEDICINE

## 2024-10-29 PROCEDURE — G0439 PPPS, SUBSEQ VISIT: HCPCS | Performed by: FAMILY MEDICINE

## 2024-10-29 PROCEDURE — 1170F FXNL STATUS ASSESSED: CPT | Performed by: FAMILY MEDICINE

## 2024-10-29 PROCEDURE — 3079F DIAST BP 80-89 MM HG: CPT | Performed by: FAMILY MEDICINE

## 2024-10-29 PROCEDURE — 3077F SYST BP >= 140 MM HG: CPT | Performed by: FAMILY MEDICINE

## 2024-10-29 RX ORDER — MULTIVIT-MIN/IRON FUM/FOLIC AC 7.5 MG-4
1 TABLET ORAL DAILY
COMMUNITY

## 2024-10-29 ASSESSMENT — ACTIVITIES OF DAILY LIVING (ADL)
DOING_HOUSEWORK: NEEDS ASSISTANCE
MANAGING_FINANCES: INDEPENDENT
GROCERY_SHOPPING: INDEPENDENT
BATHING: INDEPENDENT
TAKING_MEDICATION: INDEPENDENT
DRESSING: INDEPENDENT

## 2024-10-29 ASSESSMENT — PATIENT HEALTH QUESTIONNAIRE - PHQ9
SUM OF ALL RESPONSES TO PHQ9 QUESTIONS 1 AND 2: 0
1. LITTLE INTEREST OR PLEASURE IN DOING THINGS: NOT AT ALL
2. FEELING DOWN, DEPRESSED OR HOPELESS: NOT AT ALL

## 2024-10-29 ASSESSMENT — ENCOUNTER SYMPTOMS
OCCASIONAL FEELINGS OF UNSTEADINESS: 1
LOSS OF SENSATION IN FEET: 0
DEPRESSION: 0

## 2025-03-12 ENCOUNTER — APPOINTMENT (OUTPATIENT)
Dept: RADIOLOGY | Facility: HOSPITAL | Age: 85
End: 2025-03-12
Payer: MEDICARE

## 2025-03-17 DIAGNOSIS — I10 ESSENTIAL (PRIMARY) HYPERTENSION: ICD-10-CM

## 2025-03-17 RX ORDER — METOPROLOL SUCCINATE 100 MG/1
100 TABLET, EXTENDED RELEASE ORAL DAILY
Qty: 90 TABLET | Refills: 3 | Status: SHIPPED | OUTPATIENT
Start: 2025-03-17

## 2025-03-17 NOTE — TELEPHONE ENCOUNTER
Received request for prescription refills for patient.   Patient follows with Dr. Yeboah    Request is for Toprol   Is patient currently on medication yes    Last OV 3/27/2024  Next OV 3/26/2025    Pended for signing and sent to provider

## 2025-03-26 ENCOUNTER — APPOINTMENT (OUTPATIENT)
Dept: CARDIOLOGY | Facility: CLINIC | Age: 85
End: 2025-03-26
Payer: MEDICARE

## 2025-03-26 VITALS
HEIGHT: 62 IN | BODY MASS INDEX: 29.57 KG/M2 | SYSTOLIC BLOOD PRESSURE: 118 MMHG | DIASTOLIC BLOOD PRESSURE: 60 MMHG | WEIGHT: 160.7 LBS | HEART RATE: 72 BPM

## 2025-03-26 DIAGNOSIS — I25.10 CAD S/P PERCUTANEOUS CORONARY ANGIOPLASTY: ICD-10-CM

## 2025-03-26 DIAGNOSIS — G35 MULTIPLE SCLEROSIS, RELAPSING-REMITTING (MULTI): ICD-10-CM

## 2025-03-26 DIAGNOSIS — Z78.9 NEVER SMOKED CIGARETTES: ICD-10-CM

## 2025-03-26 DIAGNOSIS — Z98.61 CAD S/P PERCUTANEOUS CORONARY ANGIOPLASTY: ICD-10-CM

## 2025-03-26 DIAGNOSIS — I10 ESSENTIAL HYPERTENSION: ICD-10-CM

## 2025-03-26 DIAGNOSIS — I65.23 BILATERAL CAROTID ARTERY STENOSIS: ICD-10-CM

## 2025-03-26 DIAGNOSIS — E78.2 MIXED HYPERLIPIDEMIA: ICD-10-CM

## 2025-03-26 DIAGNOSIS — Z87.19 HISTORY OF GASTROESOPHAGEAL REFLUX (GERD): ICD-10-CM

## 2025-03-26 PROCEDURE — 1159F MED LIST DOCD IN RCRD: CPT | Performed by: INTERNAL MEDICINE

## 2025-03-26 PROCEDURE — 99214 OFFICE O/P EST MOD 30 MIN: CPT | Performed by: INTERNAL MEDICINE

## 2025-03-26 PROCEDURE — 1036F TOBACCO NON-USER: CPT | Performed by: INTERNAL MEDICINE

## 2025-03-26 PROCEDURE — 3074F SYST BP LT 130 MM HG: CPT | Performed by: INTERNAL MEDICINE

## 2025-03-26 PROCEDURE — 3078F DIAST BP <80 MM HG: CPT | Performed by: INTERNAL MEDICINE

## 2025-03-26 PROCEDURE — 1123F ACP DISCUSS/DSCN MKR DOCD: CPT | Performed by: INTERNAL MEDICINE

## 2025-03-26 RX ORDER — NITROGLYCERIN 0.4 MG/1
0.4 TABLET SUBLINGUAL EVERY 5 MIN PRN
Qty: 25 TABLET | Refills: 5 | Status: SHIPPED | OUTPATIENT
Start: 2025-03-26

## 2025-03-26 NOTE — PATIENT INSTRUCTIONS
Follow up office visit in 1 year.  Continue same medications/treatment.  Patient educated on proper medication use.  Patient educated on risk factor modification.  Please bring any lab results from other providers / physicians to your next appointment.    Please bring all medicines, vitamins and herbal supplements with you when you come to the office.    Prescriptions will not be filled unless you are compliant with your follow up appointments or have a follow up  appointment scheduled as per instruction of your physician.  Refills should be requested at the time of  Your visit.

## 2025-03-26 NOTE — PROGRESS NOTES
CARDIOLOGY OFFICE VISIT      CHIEF COMPLAINT:  Chief Complaint   Patient presents with    Follow-up     1 year follow up for CAD and Hypertension management           HISTORY OF PRESENT ILLNESS  The patient states that she is basically doing well.  She states she is still having difficulties with the death of her  about a year ago.  They were  60 years.  She has some atypical left inframammary discomfort which is clearly not secondary myocardial ischemia.  She does have some mild dyspnea but nothing severe or persistent.  She has some brief palpitations but nothing severe or prolonged.  She denies presyncope and syncope.      IMPRESSION:   1. Coronary artery disease, no angina.  2. Remote multivessel PCI, last 2013.  3. Essential hypertension.  4. Mixed hyperlipidemia. Unable to tolerate statins due to severe myalgias.  5. Carotid artery disease, bilateral, mild, asymptomatic.  6. History of gastroesophageal reflux.  7. Overweight.  8. Multiple sclerosis        Please excuse any errors in grammar or translation related to this dictation. Voice recognition software was utilized to prepare this document.          Past Medical History  Past Medical History:   Diagnosis Date    Personal history of other diseases of the musculoskeletal system and connective tissue 08/05/2020    History of osteopenia    Personal history of other specified conditions     History of anesthesia complications       Social History  Social History     Tobacco Use    Smoking status: Never    Smokeless tobacco: Never   Substance Use Topics    Alcohol use: Never    Drug use: Never       Family History     Family History   Problem Relation Name Age of Onset    Heart attack Mother      Heart attack Father      Multiple sclerosis Mother's Brother      Multiple sclerosis Other          Allergies:  Allergies   Allergen Reactions    Metoprolol Other and Shortness of breath    Adhesive Tape-Silicones Unknown    Advair Diskus [Fluticasone  Propion-Salmeterol] Itching     Itchy throat- sensitive to medication    Albuterol Unknown    Atorvastatin Unknown    Clopidogrel Unknown    Codeine Unknown    Ezetimibe Unknown    Fenofibrate Unknown    Fluvastatin Unknown    Mupirocin Unknown    Penicillin Unknown    Pravastatin Unknown    Shellfish Containing Products Unknown    Shellfish Derived Other    Statins-Hmg-Coa Reductase Inhibitors Unknown    Sulfa (Sulfonamide Antibiotics) Unknown    Tobradex [Tobramycin-Dexamethasone] Unknown    Trilipix [Fenofibric Acid (Choline)] Unknown    Wheat Unknown        Outpatient Medications:  Current Outpatient Medications   Medication Instructions    ascorbic acid (Vitamin C) 1,000 mg tablet 1 tablet, Daily    aspirin 81 mg chewable tablet Daily RT    butalbitaL-acetaminop-caf-cod (Fioricet W/Codeine) -99-30 mg capsule Take by mouth. As needed    cetirizine (ZyrTEC) 10 mg tablet 1 tablet, Daily    cholecalciferol (Vitamin D-3) 50 mcg (2,000 unit) capsule 1 capsule, Daily    Cozaar 50 mg, oral, Daily    cyanocobalamin, vitamin B-12, 1,000 mcg tablet, sublingual 1 tablet, Daily    flaxseed oiL 1,000 mg capsule Take by mouth. Take as directed    meloxicam (Mobic) 7.5 mg tablet 1 tablet, Daily PRN    multivitamin with minerals tablet 1 tablet, Daily    nitroglycerin (Nitrostat) 0.4 mg SL tablet 1 tablet, Every 5 min PRN    pantoprazole (Protonix) 40 mg EC tablet 1 tablet, Daily    prednisoLONE acetate (Pred Forte) 1 % ophthalmic suspension 1 drop, Daily RT    Toprol  mg, oral, Daily          REVIEW OF SYSTEMS  Review of Systems   All other systems reviewed and are negative.        VITALS  Vitals:    03/26/25 1523   BP: 118/60   Pulse: 72       PHYSICAL EXAM  Vitals and nursing note reviewed.   Constitutional:       Appearance: Healthy appearance.   Eyes:      Conjunctiva/sclera: Conjunctivae normal.      Pupils: Pupils are equal, round, and reactive to light.   Pulmonary:      Effort: Pulmonary effort is normal.       Breath sounds: Normal breath sounds.   Cardiovascular:      PMI at left midclavicular line. Normal rate. Regular rhythm.      Murmurs: There is no murmur.      No gallop.  No click. No rub.   Pulses:     Intact distal pulses.   Edema:     Peripheral edema absent.   Musculoskeletal: Normal range of motion. Skin:     General: Skin is warm and dry.   Neurological:      Mental Status: Alert and oriented to person, place and time.           ASSESSMENT AND PLAN  Diagnoses and all orders for this visit:  CAD S/P percutaneous coronary angioplasty  Essential hypertension  Mixed hyperlipidemia  Bilateral carotid artery stenosis  History of gastroesophageal reflux (GERD)  Multiple sclerosis, relapsing-remitting (Multi)  BMI 29.0-29.9,adult  Never smoked cigarettes      [unfilled]      I,Silvina Schmidt LPN am scribing for, and in the presence of Dr. Harley Yeboah.    I, Dr. Harley Yeboah, personally performed the services described in the documentation as scribed by Silvina Schmidt LPN in my presence, and confirm it is both accurate and complete.

## 2025-04-07 ENCOUNTER — HOSPITAL ENCOUNTER (OUTPATIENT)
Dept: RADIOLOGY | Facility: HOSPITAL | Age: 85
Discharge: HOME | End: 2025-04-07
Payer: MEDICARE

## 2025-04-07 DIAGNOSIS — N30.10 INTERSTITIAL CYSTITIS (CHRONIC) WITHOUT HEMATURIA: ICD-10-CM

## 2025-04-07 DIAGNOSIS — N31.9 NEUROMUSCULAR DYSFUNCTION OF BLADDER, UNSPECIFIED: ICD-10-CM

## 2025-04-07 PROCEDURE — 76770 US EXAM ABDO BACK WALL COMP: CPT | Performed by: STUDENT IN AN ORGANIZED HEALTH CARE EDUCATION/TRAINING PROGRAM

## 2025-04-07 PROCEDURE — 76770 US EXAM ABDO BACK WALL COMP: CPT

## 2025-04-28 ENCOUNTER — HOSPITAL ENCOUNTER (OUTPATIENT)
Dept: NEUROLOGY | Facility: HOSPITAL | Age: 85
Discharge: HOME | End: 2025-04-28
Payer: MEDICARE

## 2025-04-28 DIAGNOSIS — G56.03 CARPAL TUNNEL SYNDROME, BILATERAL UPPER LIMBS: ICD-10-CM

## 2025-04-28 PROCEDURE — 95886 MUSC TEST DONE W/N TEST COMP: CPT

## 2025-05-06 ENCOUNTER — HOSPITAL ENCOUNTER (OUTPATIENT)
Dept: RESPIRATORY THERAPY | Facility: HOSPITAL | Age: 85
Discharge: HOME | End: 2025-05-06
Payer: MEDICARE

## 2025-05-06 DIAGNOSIS — R06.00 DYSPNEA, UNSPECIFIED TYPE: ICD-10-CM

## 2025-05-06 DIAGNOSIS — J45.909 ASTHMA, UNSPECIFIED ASTHMA SEVERITY, UNSPECIFIED WHETHER COMPLICATED, UNSPECIFIED WHETHER PERSISTENT (HHS-HCC): ICD-10-CM

## 2025-05-06 PROCEDURE — 94726 PLETHYSMOGRAPHY LUNG VOLUMES: CPT

## 2025-06-16 LAB
MGC ASCENT PFT - FEV1 - PRE: 1.79
MGC ASCENT PFT - FEV1 - PREDICTED: 1.74
MGC ASCENT PFT - FVC - PRE: 2.22
MGC ASCENT PFT - FVC - PREDICTED: 2.29

## 2025-08-12 ENCOUNTER — HOSPITAL ENCOUNTER (OUTPATIENT)
Dept: RADIOLOGY | Facility: HOSPITAL | Age: 85
Discharge: HOME | End: 2025-08-12
Payer: MEDICARE

## 2025-08-12 DIAGNOSIS — M54.2 CERVICALGIA: ICD-10-CM

## 2025-08-12 DIAGNOSIS — G56.03 CARPAL TUNNEL SYNDROME, BILATERAL UPPER LIMBS: ICD-10-CM

## 2025-08-12 DIAGNOSIS — M54.16 RADICULOPATHY, LUMBAR REGION: ICD-10-CM

## 2025-08-12 DIAGNOSIS — G35 MULTIPLE SCLEROSIS (MULTI): ICD-10-CM

## 2025-08-12 DIAGNOSIS — R26.89 OTHER ABNORMALITIES OF GAIT AND MOBILITY: ICD-10-CM

## 2025-08-12 DIAGNOSIS — M54.14 RADICULOPATHY, THORACIC REGION: ICD-10-CM

## 2025-08-12 PROCEDURE — 70553 MRI BRAIN STEM W/O & W/DYE: CPT

## 2025-08-12 PROCEDURE — 70553 MRI BRAIN STEM W/O & W/DYE: CPT | Performed by: RADIOLOGY

## 2025-08-12 PROCEDURE — A9575 INJ GADOTERATE MEGLUMI 0.1ML: HCPCS | Performed by: SPECIALIST

## 2025-08-12 PROCEDURE — 2550000001 HC RX 255 CONTRASTS: Performed by: SPECIALIST

## 2025-08-12 RX ORDER — GADOTERATE MEGLUMINE 376.9 MG/ML
0.2 INJECTION INTRAVENOUS
Status: COMPLETED | OUTPATIENT
Start: 2025-08-12 | End: 2025-08-12

## 2025-08-12 RX ADMIN — GADOTERATE MEGLUMINE 14.5 ML: 376.9 INJECTION INTRAVENOUS at 14:03

## 2026-03-25 ENCOUNTER — APPOINTMENT (OUTPATIENT)
Dept: CARDIOLOGY | Facility: CLINIC | Age: 86
End: 2026-03-25
Payer: MEDICARE